# Patient Record
Sex: MALE | Race: BLACK OR AFRICAN AMERICAN | NOT HISPANIC OR LATINO | URBAN - METROPOLITAN AREA
[De-identification: names, ages, dates, MRNs, and addresses within clinical notes are randomized per-mention and may not be internally consistent; named-entity substitution may affect disease eponyms.]

---

## 2020-05-26 ENCOUNTER — EMERGENCY (EMERGENCY)
Facility: HOSPITAL | Age: 30
LOS: 0 days | Discharge: HOME | End: 2020-05-27
Attending: EMERGENCY MEDICINE
Payer: MEDICAID

## 2020-05-26 VITALS
DIASTOLIC BLOOD PRESSURE: 71 MMHG | WEIGHT: 126.99 LBS | TEMPERATURE: 98 F | RESPIRATION RATE: 16 BRPM | HEART RATE: 81 BPM | OXYGEN SATURATION: 97 % | SYSTOLIC BLOOD PRESSURE: 122 MMHG | HEIGHT: 65 IN

## 2020-05-26 PROCEDURE — 12002 RPR S/N/AX/GEN/TRNK2.6-7.5CM: CPT

## 2020-05-26 PROCEDURE — 99283 EMERGENCY DEPT VISIT LOW MDM: CPT | Mod: 25

## 2020-05-26 NOTE — ED ADULT TRIAGE NOTE - CHIEF COMPLAINT QUOTE
HX DFSP pt states he was due for surgery however canceled due to COVID. PT states he noted blood ousing form mass 1 week ago , however worsened.  PW with dizziness denies blurry vision., EMS placed ace wrap to head. Pt stastes tested C 19 negative 1 week ago. Kizzy drake MD at [facility]. HX DFSP CA pt states he was due for surgery however canceled multiple times due to COVID pandemic . PT states he noted blood ousing form mass which increased in size 1 week ago , however worsened today.  PW with dizziness denies blurry vision, EMS placed ace wrap to head. Pt states tested C 19 negative 1 week ago. Kizzy drake MD at [facility]

## 2020-05-26 NOTE — ED PROVIDER NOTE - OBJECTIVE STATEMENT
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  Pt states he had previously picked scab, but has never had large amount of bleeding before.  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  Pt states he had previously picked scab, but has never had large amount of bleeding before.  Pt states tonight he noted a very large amount of blood coming from wound.  Pt attempted to take a shower to decr bleeding, but did not work.  Denies use of anticoagulants, LOC, CP, SOB.  EMS applied pressure & dressings, brought pt to ED.    No fever/chills, No photophobia/eye pain/changes in vision, No ear pain/sore throat/dysphagia, No chest pain/palpitations, no SOB/cough/wheeze/stridor, No abdominal pain, No neck/back pain, no rash, no changes in neurological status/function.

## 2020-05-26 NOTE — ED PROVIDER NOTE - PATIENT PORTAL LINK FT
You can access the FollowMyHealth Patient Portal offered by [facility] by registering at the following website: [email or web address]. By joining [email or web address] FollowMyHealth portal, you will also be able to view your health information using other applications (apps) compatible with our system.

## 2020-05-26 NOTE — ED PROVIDER NOTE - MDM ORDERS SUBMITTED SELECTION
Here with swelling to the left side of the neck for about 8 hours. Has pulled the neck to the right for comfort. Has noted some difficulty with swallowing solid foods but not with taking fluids. Says it is feeling somewhat better  Ibuprofen at noon today   Not Applicable

## 2020-05-26 NOTE — ED PROVIDER NOTE - CLINICAL SUMMARY MEDICAL DECISION MAKING FREE TEXT BOX
Dr. Crawford [phone] Pt w bleeding to scalp from skin avulsion.  Bleeding stopped w pressure & injection of lidocaine w epi.  Wound then cleansed w chlorhexidine & glue cap placed w dermabond.  pt tolerated procedure well, remained NV intact.  Instructed on care.  Attempted to reach Dr. Crawford [phone] to ensure follow up, but no .  Dc home to follow up as outpt, instructed to return to ED if sx worsen.

## 2020-05-26 NOTE — ED PROVIDER NOTE - PHYSICAL EXAMINATION
Gen: Alert, NAD  Head: +large tumor on top of scalp w 3x2.5cm area of skin avulsion oozing blood, normal lids/conjunctiva  ENT: normal hearing, patent oropharynx, MMM  Neck: supple, no tenderness/meningismus, FROM, Trachea midline  Pulm: Bilateral clear BS, normal resp effort, no wheeze/stridor/retractions  CV: RRR, no M/R/G, +dist pulses  Abd: soft, NT/ND, +BS, no guarding/rebound tenderness  Mskel: no edema/erythema/cyanosis  Skin: no rash  Neuro: AAOx3, no sensory/motor deficits, CN 2-12 intact

## 2020-05-27 NOTE — ED ADULT NURSE NOTE - OBJECTIVE STATEMENT
HX DFSP CA pt states he was due for surgery however canceled multiple times due to COVID pandemic . PT states he noted blood ousing form mass which increased in size 1 week ago , however worsened today.  PW with dizziness denies blurry vision, EMS placed ace wrap to head. Pt states tested C 19 negative 1 week ago. Kizzy drake MD at [facility]

## 2020-05-27 NOTE — ED ADULT NURSE NOTE - CHIEF COMPLAINT QUOTE
HX DFSP CA pt states he was due for surgery however canceled multiple times due to COVID pandemic . PT states he noted blood ousing form mass which increased in size 1 week ago , however worsened today.  PW with dizziness denies blurry vision, EMS placed ace wrap to head. Pt states tested C 19 negative 1 week ago. Kizzy drake MD at [facility]

## 2020-05-28 DIAGNOSIS — S01.00XA UNSPECIFIED OPEN WOUND OF SCALP, INITIAL ENCOUNTER: ICD-10-CM
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
## 2020-05-28 DIAGNOSIS — D48.5 NEOPLASM OF UNCERTAIN BEHAVIOR OF SKIN: ICD-10-CM
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
## 2020-05-28 DIAGNOSIS — Z79.899 OTHER LONG TERM (CURRENT) DRUG THERAPY: ICD-10-CM
